# Patient Record
(demographics unavailable — no encounter records)

---

## 2019-03-05 NOTE — NUR
PT WALKED BACK FROM [facility] AT THIS TIME. STEADY UPON AMBULATION. URINE SAMPLE 
OBTAINED AND SENT TO LAB. PT AO X 4. SKIN PWD. RESP EVEN AND EQAUL. PT AWARE WE 
ARE WAITING FOR LAB/IMAGING RESULTS. CALL LIGHT WITHIN REACH. WILL CONT TO 
MONITOR PT.

-------------------------------------------------------------------------------

Addendum: 19 at 1942 by [email or web address]

-------------------------------------------------------------------------------

PT WALKED BACK FROM [facility]. STEADY UPON AMBULATION. ASSUMED CARE OF PT AT THIS 
TIME. THIS IS A 29 YO FEMALE WHO PRESENTS TO THE ER C/O SPOTTING/CRAMPING SINCE 
THIS MORNING. PT IS APPROX 8 WEEKS. LMP 19.  P:1 A:0. URINE SAMPLE 
OBTAINED AND SENT TO LAB. PT AO X 4. SKIN PWD. RESP EVEN AND EQAUL. PT AWARE WE 
ARE WAITING FOR LAB/IMAGING RESULTS. CALL LIGHT WITHIN REACH. WILL CONT TO 
MONITOR PT.